# Patient Record
Sex: FEMALE | Race: WHITE | NOT HISPANIC OR LATINO | Employment: OTHER | URBAN - METROPOLITAN AREA
[De-identification: names, ages, dates, MRNs, and addresses within clinical notes are randomized per-mention and may not be internally consistent; named-entity substitution may affect disease eponyms.]

---

## 2017-07-13 ENCOUNTER — OFFICE VISIT (OUTPATIENT)
Dept: URGENT CARE | Facility: PHYSICIAN GROUP | Age: 62
End: 2017-07-13
Payer: COMMERCIAL

## 2017-07-13 VITALS
HEART RATE: 88 BPM | SYSTOLIC BLOOD PRESSURE: 118 MMHG | TEMPERATURE: 98.8 F | RESPIRATION RATE: 14 BRPM | BODY MASS INDEX: 22.71 KG/M2 | WEIGHT: 145 LBS | DIASTOLIC BLOOD PRESSURE: 78 MMHG | OXYGEN SATURATION: 98 %

## 2017-07-13 DIAGNOSIS — J02.9 PHARYNGITIS, UNSPECIFIED ETIOLOGY: ICD-10-CM

## 2017-07-13 DIAGNOSIS — J40 BRONCHITIS: ICD-10-CM

## 2017-07-13 PROCEDURE — 99203 OFFICE O/P NEW LOW 30 MIN: CPT | Performed by: PHYSICIAN ASSISTANT

## 2017-07-13 RX ORDER — AZITHROMYCIN 250 MG/1
TABLET, FILM COATED ORAL
Qty: 6 TAB | Refills: 0 | Status: SHIPPED | OUTPATIENT
Start: 2017-07-13

## 2017-07-13 ASSESSMENT — ENCOUNTER SYMPTOMS
MYALGIAS: 1
HEADACHES: 1
NAUSEA: 0
SORE THROAT: 1
DIZZINESS: 0
COUGH: 1
SHORTNESS OF BREATH: 0
DIARRHEA: 0
ABDOMINAL PAIN: 0

## 2017-07-13 NOTE — PROGRESS NOTES
Subjective:      Jasmin Crandall is a 62 y.o. female who presents with Pharyngitis    Current medications reviewed.  Past Medical History   Diagnosis Date   • Dyslipidemia    • Colon polyps    • Breast cancer    • Vitamin d deficiency    • Hearing impaired      Social History   Substance Use Topics   • Smoking status: Never Smoker    • Smokeless tobacco: Never Used   • Alcohol Use: No     Family History Reviewed: noncontributory      2 weeks with moderate sore throat, cough and mucous, denies fevers, mucous is increasing and darkening.  She reports mitral valve replacement and is concerned with chest infection.      Pharyngitis   Associated symptoms include congestion, coughing and headaches. Pertinent negatives include no abdominal pain, diarrhea, ear pain or shortness of breath.       Review of Systems   Constitutional: Positive for malaise/fatigue.   HENT: Positive for congestion and sore throat. Negative for ear pain.    Respiratory: Positive for cough. Negative for shortness of breath.    Cardiovascular: Negative for chest pain.   Gastrointestinal: Negative for nausea, abdominal pain and diarrhea.   Musculoskeletal: Positive for myalgias. Negative for joint pain.   Skin: Negative for rash.   Neurological: Positive for headaches. Negative for dizziness.   All other systems reviewed and are negative.         Objective:     /78 mmHg  Pulse 88  Temp(Src) 37.1 °C (98.8 °F)  Resp 14  Wt 65.772 kg (145 lb)  SpO2 98%     Physical Exam   Constitutional: She is oriented to person, place, and time. She appears well-developed and well-nourished.   HENT:   Right Ear: Tympanic membrane and ear canal normal.   Left Ear: Tympanic membrane and ear canal normal.   Nose: Mucosal edema present. Right sinus exhibits no maxillary sinus tenderness and no frontal sinus tenderness. Left sinus exhibits no maxillary sinus tenderness and no frontal sinus tenderness.   Mouth/Throat: Posterior oropharyngeal erythema present.  No posterior oropharyngeal edema.   Eyes: EOM are normal. Pupils are equal, round, and reactive to light.   Cardiovascular: Normal rate and regular rhythm.    Pulmonary/Chest: She has decreased breath sounds (mild with mod coarseness) in the right middle field, the right lower field, the left middle field and the left lower field. She has no wheezes. She has no rales.   Neurological: She is alert and oriented to person, place, and time.   Skin: Skin is warm and dry.   Nursing note and vitals reviewed.              Assessment/Plan:     1. Bronchitis  azithromycin (ZITHROMAX) 250 MG Tab   2. Pharyngitis, unspecified etiology  azithromycin (ZITHROMAX) 250 MG Tab     Take all abx, push fluids rest.  OTC meds discussed to control symptoms.  Follow up with pcp in 2 weeks with further concerns. Patient reports understanding and agrees with plan.

## 2017-07-13 NOTE — MR AVS SNAPSHOT
Jasmin Crandall   2017 12:45 PM   Office Visit   MRN: [id number]    Department:  [facility]   Dept Phone:  [phone]    Description:  Female : 1955   Provider:  Nestor Castañeda PA-C           Reason for Visit     Pharyngitis x2wks with cough/ mucus      Allergies as of 2017     Allergen Noted Reactions    Pcn [Penicillins] 2009       Versed 2013       Rash      You were diagnosed with     Bronchitis   [610785]       Pharyngitis, unspecified etiology   [1529906]         Vital Signs     Blood Pressure Pulse Temperature Respirations Weight Oxygen Saturation    118/78 mmHg 88 37.1 °C (98.8 °F) 14 65.772 kg (145 lb) 98%    Smoking Status                   Never Smoker            Basic Information     Date Of Birth Sex Race Ethnicity Preferred Language    1955 Female White Non- English      Problem List              ICD-10-CM Priority Class Noted - Resolved    Breast cancer (CMS-HCC) C50.919   2009 - Present    Colon polyps K63.5   2009 - Present    Dyslipidemia E78.5   2009 - Present    Preventative health care Z00.00   2012 - Present    Vitamin d deficiency    Unknown - Present    Hearing impaired H91.90   Unknown - Present    Heart murmur R01.1   2013 - Present    Severe mitral regurgitation I34.0   2013 - Present    Mitral valve prolapse I34.1 Medium  2013 - Present      Health Maintenance        Date Due Completion Dates    IMM DTaP/Tdap/Td Vaccine (1 - Tdap) 1972 ---    COLONOSCOPY 2003 ---    IMM ZOSTER VACCINE 2013 ---    MAMMOGRAM 2014, 2012, 2011, 2010, 2009, 2009, 2008, 2008, 2007, 2007, 2006, 2005, 11/10/2004    PAP SMEAR 2015, 2011, 2010, 2009    IMM INFLUENZA (1) 2017 ---            Current Immunizations     No immunizations on file.      Below and/or attached are the  medications your provider expects you to take. Review all of your home medications and newly ordered medications with your provider and/or pharmacist. Follow medication instructions as directed by your provider and/or pharmacist. Please keep your medication list with you and share with your provider. Update the information when medications are discontinued, doses are changed, or new medications (including over-the-counter products) are added; and carry medication information at all times in the event of emergency situations     Allergies:  PCN - (reactions not documented)     VERSED - (reactions not documented)               Medications  Valid as of: July 13, 2017 -  2:24 PM    Generic Name Brand Name Tablet Size Instructions for use    Azithromycin (Tab) ZITHROMAX 250 MG 2 tabs day number one then one tab daily for remaining 4 days        Calcium Citrate-Vitamin D   Take 1 Tab by mouth every day.          Cholecalciferol (Tab) VITAMIN D 400 UNIT Take 400 Units by mouth every day.          Multiple Vitamin (Tab) THERAGRAN  Take 1 Tab by mouth every day.          .                 Medicines prescribed today were sent to:     [id number] - [place], [place] - [address] [address]    [address] [place] [address]    Phone: [phone] Fax: [phone]    Open 24 Hours?: No      Medication refill instructions:       If your prescription bottle indicates you have medication refills left, it is not necessary to call your provider’s office. Please contact your pharmacy and they will refill your medication.    If your prescription bottle indicates you do not have any refills left, you may request refills at any time through one of the following ways: The online [employer] system (except Urgent Care), by calling your provider’s office, or by asking your pharmacy to contact your provider’s office with a refill request. Medication refills are processed only during regular business hours and may not be available until the next  business day. Your provider may request additional information or to have a follow-up visit with you prior to refilling your medication.   *Please Note: Medication refills are assigned a new Rx number when refilled electronically. Your pharmacy may indicate that no refills were authorized even though a new prescription for the same medication is available at the pharmacy. Please request the medicine by name with the pharmacy before contacting your provider for a refill.           [employer] Access Code: Activation code not generated  Current [employer] Status: Active

## 2017-08-02 ENCOUNTER — OFFICE VISIT (OUTPATIENT)
Dept: URGENT CARE | Facility: PHYSICIAN GROUP | Age: 62
End: 2017-08-02
Payer: COMMERCIAL

## 2017-08-02 VITALS
WEIGHT: 145 LBS | SYSTOLIC BLOOD PRESSURE: 110 MMHG | BODY MASS INDEX: 22.71 KG/M2 | HEART RATE: 78 BPM | RESPIRATION RATE: 14 BRPM | DIASTOLIC BLOOD PRESSURE: 72 MMHG | OXYGEN SATURATION: 97 % | TEMPERATURE: 98.6 F

## 2017-08-02 DIAGNOSIS — J06.9 PROTRACTED URI: ICD-10-CM

## 2017-08-02 PROCEDURE — 99213 OFFICE O/P EST LOW 20 MIN: CPT | Performed by: NURSE PRACTITIONER

## 2017-08-02 RX ORDER — BENZONATATE 100 MG/1
100 CAPSULE ORAL 3 TIMES DAILY PRN
Qty: 60 CAP | Refills: 0 | Status: SHIPPED | OUTPATIENT
Start: 2017-08-02

## 2017-08-02 RX ORDER — PREDNISONE 20 MG/1
TABLET ORAL
Qty: 10 TAB | Refills: 0 | Status: SHIPPED | OUTPATIENT
Start: 2017-08-02

## 2017-08-02 ASSESSMENT — ENCOUNTER SYMPTOMS
SORE THROAT: 0
SHORTNESS OF BREATH: 0
COUGH: 1
CHILLS: 0
SPUTUM PRODUCTION: 1
FEVER: 0

## 2017-08-02 NOTE — PROGRESS NOTES
Subjective:      Jasmin Crandall is a 62 y.o. female who presents with Cough            Cough  This is a new problem. Episode onset: Pt reports that she was seen about two weeks ago for a URI. She states she feels better but continues to have a severe cough at night and intermittently through the day. The problem has been unchanged. The problem occurs constantly. The cough is productive of sputum (white, sometimes clear sputum). Pertinent negatives include no chills, fever, sore throat or shortness of breath. Nothing aggravates the symptoms. She has tried rest and OTC cough suppressant for the symptoms. The treatment provided no relief. Her past medical history is significant for bronchitis. There is no history of pneumonia.       Review of Systems   Constitutional: Negative for fever and chills.   HENT: Negative for congestion and sore throat.    Respiratory: Positive for cough and sputum production. Negative for shortness of breath.    All other systems reviewed and are negative.    Past Medical History   Diagnosis Date   • Dyslipidemia    • Colon polyps    • Breast cancer    • Vitamin d deficiency    • Hearing impaired       Past Surgical History   Procedure Laterality Date   • Abdominal hysterectomy total       d/t hx of breast cancer, cyst in ovary   • Lumpectomy     • Mastectomy  1993     left w/reconstruction   • Tonsillectomy and adenoidectomy     • Sinuscopy        Social History     Social History   • Marital Status:      Spouse Name: N/A   • Number of Children: N/A   • Years of Education: N/A     Occupational History   • Not on file.     Social History Main Topics   • Smoking status: Never Smoker    • Smokeless tobacco: Never Used   • Alcohol Use: No   • Drug Use: No   • Sexual Activity: Not on file      Comment: , two kids, retired     Other Topics Concern   • Not on file     Social History Narrative          Objective:     /72 mmHg  Pulse 78  Temp(Src) 37 °C (98.6 °F)  Resp 14   Wt 65.772 kg (145 lb)  SpO2 97%     Physical Exam   Constitutional: She is oriented to person, place, and time. Vital signs are normal. She appears well-developed and well-nourished.   HENT:   Head: Normocephalic and atraumatic.   Eyes: EOM are normal. Pupils are equal, round, and reactive to light.   Neck: Normal range of motion.   Cardiovascular: Normal rate and regular rhythm.    Pulmonary/Chest: Effort normal. She has rhonchi in the right upper field and the left upper field.   Harsh breath sounds auscultated in lower lobes   Musculoskeletal: Normal range of motion.   Neurological: She is alert and oriented to person, place, and time.   Skin: Skin is warm and dry.   Psychiatric: She has a normal mood and affect. Her behavior is normal. Thought content normal.   Vitals reviewed.              Assessment/Plan:     1. Protracted URI  - benzonatate (TESSALON) 100 MG Cap; Take 1 Cap by mouth 3 times a day as needed for Cough.  Dispense: 60 Cap; Refill: 0  - predniSONE (DELTASONE) 20 MG Tab; Take 2 tabs PO daily for 5 days  Dispense: 10 Tab; Refill: 0    Increase water intake  RTC if s/s do not improve  Supportive care, differential diagnoses, and indications for immediate follow-up discussed with patient.    Pathogenesis of diagnosis discussed including typical length and natural progression.      Instructed to return to  or nearest emergency department if symptoms fail to improve, for any change in condition, further concerns, or new concerning symptoms.  Patient states understanding of the plan of care and discharge instructions.

## 2017-08-02 NOTE — MR AVS SNAPSHOT
Jasmin Crandall   2017 11:00 AM   Office Visit   MRN: [id number]    Department:  [facility]   Dept Phone:  [phone]    Description:  Female : 1955   Provider:  MARCO ANTONIO French           Reason for Visit     Cough x 3 weeks       Allergies as of 2017     Allergen Noted Reactions    Pcn [Penicillins] 2009       Versed 2013       Rash      You were diagnosed with     Protracted URI   [[id number]]         Vital Signs     Blood Pressure Pulse Temperature Respirations Weight Oxygen Saturation    110/72 mmHg 78 37 °C (98.6 °F) 14 65.772 kg (145 lb) 97%    Smoking Status                   Never Smoker            Basic Information     Date Of Birth Sex Race Ethnicity Preferred Language    1955 Female White Non- English      Problem List              ICD-10-CM Priority Class Noted - Resolved    Breast cancer (CMS-HCC) C50.919   2009 - Present    Colon polyps K63.5   2009 - Present    Dyslipidemia E78.5   2009 - Present    Preventative health care Z00.00   2012 - Present    Vitamin d deficiency    Unknown - Present    Hearing impaired H91.90   Unknown - Present    Heart murmur R01.1   2013 - Present    Severe mitral regurgitation I34.0   2013 - Present    Mitral valve prolapse I34.1 Medium  2013 - Present      Health Maintenance        Date Due Completion Dates    IMM DTaP/Tdap/Td Vaccine (1 - Tdap) 1972 ---    COLONOSCOPY 2003 ---    IMM ZOSTER VACCINE 2013 ---    MAMMOGRAM 2014, 2012, 2011, 2010, 2009, 2009, 2008, 2008, 2007, 2007, 2006, 2005, 11/10/2004    PAP SMEAR 2015, 2011, 2010, 2009    IMM INFLUENZA (1) 2017 ---            Current Immunizations     No immunizations on file.      Below and/or attached are the medications your provider expects you to take. Review all of your home  medications and newly ordered medications with your provider and/or pharmacist. Follow medication instructions as directed by your provider and/or pharmacist. Please keep your medication list with you and share with your provider. Update the information when medications are discontinued, doses are changed, or new medications (including over-the-counter products) are added; and carry medication information at all times in the event of emergency situations     Allergies:  PCN - (reactions not documented)     VERSED - (reactions not documented)               Medications  Valid as of: August 02, 2017 - 11:14 AM    Generic Name Brand Name Tablet Size Instructions for use    Azithromycin (Tab) ZITHROMAX 250 MG 2 tabs day number one then one tab daily for remaining 4 days        Benzonatate (Cap) TESSALON 100 MG Take 1 Cap by mouth 3 times a day as needed for Cough.        Calcium Citrate-Vitamin D   Take 1 Tab by mouth every day.          Cholecalciferol (Tab) VITAMIN D 400 UNIT Take 400 Units by mouth every day.          Multiple Vitamin (Tab) THERAGRAN  Take 1 Tab by mouth every day.          PredniSONE (Tab) DELTASONE 20 MG Take 2 tabs PO daily for 5 days        .                 Medicines prescribed today were sent to:     [facility] [address] [place] [address] [place], [place] - [address] AT [facility]. & [address]    [address] [place] [address]    Phone: [phone] Fax: [phone]    Open 24 Hours?: No      Medication refill instructions:       If your prescription bottle indicates you have medication refills left, it is not necessary to call your provider’s office. Please contact your pharmacy and they will refill your medication.    If your prescription bottle indicates you do not have any refills left, you may request refills at any time through one of the following ways: The online [employer] system (except Urgent Care), by calling your provider’s office, or by asking your pharmacy to contact your  provider’s office with a refill request. Medication refills are processed only during regular business hours and may not be available until the next business day. Your provider may request additional information or to have a follow-up visit with you prior to refilling your medication.   *Please Note: Medication refills are assigned a new Rx number when refilled electronically. Your pharmacy may indicate that no refills were authorized even though a new prescription for the same medication is available at the pharmacy. Please request the medicine by name with the pharmacy before contacting your provider for a refill.           [employer] Access Code: Activation code not generated  Current [employer] Status: Active

## 2021-03-03 DIAGNOSIS — Z23 NEED FOR VACCINATION: ICD-10-CM
